# Patient Record
Sex: MALE | URBAN - METROPOLITAN AREA
[De-identification: names, ages, dates, MRNs, and addresses within clinical notes are randomized per-mention and may not be internally consistent; named-entity substitution may affect disease eponyms.]

---

## 2023-10-31 ENCOUNTER — MEDICAL CORRESPONDENCE (OUTPATIENT)
Dept: HEALTH INFORMATION MANAGEMENT | Facility: CLINIC | Age: 28
End: 2023-10-31
Payer: COMMERCIAL

## 2023-10-31 DIAGNOSIS — Z31.448 ENCOUNTER FOR OTHER GENETIC TESTING OF MALE FOR PROCREATIVE MANAGEMENT: Primary | ICD-10-CM

## 2023-10-31 NOTE — PROGRESS NOTES
October 31, 2023    Js opted to proceed with carrier screening for CFTR only through [facility] via saliva. A kit will be mailed to his home. 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     Romelia Sandoval MS, [facility]  Licensed Genetic Counselor  [facility]  Maternal Fetal Medicine  [email or web address]  [phone]

## 2023-11-06 ENCOUNTER — TRANSFERRED RECORDS (OUTPATIENT)
Dept: HEALTH INFORMATION MANAGEMENT | Facility: CLINIC | Age: 28
End: 2023-11-06
Payer: COMMERCIAL

## 2023-11-22 ENCOUNTER — TRANSFERRED RECORDS (OUTPATIENT)
Dept: HEALTH INFORMATION MANAGEMENT | Facility: CLINIC | Age: 28
End: 2023-11-22
Payer: COMMERCIAL

## 2025-06-26 ENCOUNTER — OFFICE VISIT (OUTPATIENT)
Dept: URGENT CARE | Facility: URGENT CARE | Age: 30
End: 2025-06-26
Payer: COMMERCIAL

## 2025-06-26 VITALS
SYSTOLIC BLOOD PRESSURE: 115 MMHG | DIASTOLIC BLOOD PRESSURE: 77 MMHG | WEIGHT: 163.1 LBS | HEIGHT: 71 IN | OXYGEN SATURATION: 99 % | HEART RATE: 85 BPM | TEMPERATURE: 97.4 F | BODY MASS INDEX: 22.83 KG/M2 | RESPIRATION RATE: 22 BRPM

## 2025-06-26 DIAGNOSIS — K52.9 GASTROENTERITIS: Primary | ICD-10-CM

## 2025-06-26 DIAGNOSIS — R19.7 DIARRHEA, UNSPECIFIED TYPE: ICD-10-CM

## 2025-06-26 LAB
ALBUMIN SERPL-MCNC: 4.1 G/DL (ref 3.4–5)
ALP SERPL-CCNC: 49 U/L (ref 40–150)
ALT SERPL W P-5'-P-CCNC: 24 U/L (ref 0–70)
ANION GAP SERPL CALCULATED.3IONS-SCNC: 5 MMOL/L (ref 3–14)
AST SERPL W P-5'-P-CCNC: 40 U/L (ref 0–45)
BASOPHILS # BLD AUTO: 0.1 10E3/UL (ref 0–0.2)
BASOPHILS NFR BLD AUTO: 1 %
BILIRUB SERPL-MCNC: 0.8 MG/DL (ref 0.2–1.3)
BUN SERPL-MCNC: 12 MG/DL (ref 7–30)
CALCIUM SERPL-MCNC: 9 MG/DL (ref 8.5–10.1)
CHLORIDE BLD-SCNC: 105 MMOL/L (ref 94–109)
CO2 SERPL-SCNC: 29 MMOL/L (ref 20–32)
CREAT SERPL-MCNC: 0.8 MG/DL (ref 0.66–1.25)
EGFRCR SERPLBLD CKD-EPI 2021: >90 ML/MIN/1.73M2
EOSINOPHIL # BLD AUTO: 0.1 10E3/UL (ref 0–0.7)
EOSINOPHIL NFR BLD AUTO: 1 %
ERYTHROCYTE [DISTWIDTH] IN BLOOD BY AUTOMATED COUNT: 11.7 % (ref 10–15)
GLUCOSE BLD-MCNC: 92 MG/DL (ref 70–99)
HCT VFR BLD AUTO: 44.1 % (ref 40–53)
HGB BLD-MCNC: 15.3 G/DL (ref 13.3–17.7)
IMM GRANULOCYTES # BLD: 0 10E3/UL
IMM GRANULOCYTES NFR BLD: 0 %
LYMPHOCYTES # BLD AUTO: 1.7 10E3/UL (ref 0.8–5.3)
LYMPHOCYTES NFR BLD AUTO: 23 %
MCH RBC QN AUTO: 29.7 PG (ref 26.5–33)
MCHC RBC AUTO-ENTMCNC: 34.7 G/DL (ref 31.5–36.5)
MCV RBC AUTO: 86 FL (ref 78–100)
MONOCYTES # BLD AUTO: 0.9 10E3/UL (ref 0–1.3)
MONOCYTES NFR BLD AUTO: 12 %
NEUTROPHILS # BLD AUTO: 4.7 10E3/UL (ref 1.6–8.3)
NEUTROPHILS NFR BLD AUTO: 63 %
PLATELET # BLD AUTO: 293 10E3/UL (ref 150–450)
POTASSIUM BLD-SCNC: 4.1 MMOL/L (ref 3.4–5.3)
PROT SERPL-MCNC: 7.8 G/DL (ref 6.8–8.8)
RBC # BLD AUTO: 5.16 10E6/UL (ref 4.4–5.9)
SODIUM SERPL-SCNC: 139 MMOL/L (ref 135–145)
WBC # BLD AUTO: 7.5 10E3/UL (ref 4–11)

## 2025-06-26 ASSESSMENT — ENCOUNTER SYMPTOMS
VOMITING: 0
FREQUENCY: 0
DIFFICULTY URINATING: 0
RESPIRATORY NEGATIVE: 1
CARDIOVASCULAR NEGATIVE: 1
DIARRHEA: 1
ABDOMINAL PAIN: 0
FATIGUE: 1
CHILLS: 1
DYSURIA: 0
HEMATOCHEZIA: 0
EYES NEGATIVE: 1
NAUSEA: 1

## 2025-06-26 NOTE — PROGRESS NOTES
Urgent Care Clinic Visit    Chief Complaint   Patient presents with    Abdominal Pain     Stomach pain x 4 days, diarrhea,fever, chills, headache, fatigued, lightheaded,               6/26/2025     1:08 PM   Additional Questions   Roomed by kinza

## 2025-06-26 NOTE — PATIENT INSTRUCTIONS
Examination and lab work is reassuring.    Lipase pending - will only call if elevated    Stool studies pending - will only call if positive results; do NOT take any anti-diarrheals until we know the results of your stool studies.    Increase fluids with water, Pedialyte, Gatorade, or rehydrating beverages. Take small frequent sips (even a little as a spoonful every 5 minutes), do not take large gulps or amounts of fluid at a time.     Alternate Tylenol and Ibuprofen as needed for aches, pains or fever.     Follow clear liquid to BRAT diet (bananas, rice, applesauce, toast) as needed for any upset stomach. (If upset stomach avoid dairy and meat for 24 hours.)     Rest as much as possible.     Referral to primary care to establish care.     Follow up clinic if symptoms persist, or if they worsen (dehydration, specific abdominal pain, etc) go to the Emergency Department.

## 2025-06-26 NOTE — PROGRESS NOTES
Assessment & Plan       ICD-10-CM    1. Gastroenteritis  K52.9       2. Diarrhea, unspecified type  R19.7 Lipase     CBC with platelets and differential     Comprehensive metabolic panel (BMP + Alb, Alk Phos, ALT, AST, Total. Bili, TP)     Lipase     CBC with platelets and differential     Comprehensive metabolic panel (BMP + Alb, Alk Phos, ALT, AST, Total. Bili, TP)     Focused Enteric Pathogen Panel by PCR     Primary Care Referral     Focused Enteric Pathogen Panel by PCR     CANCELED: Comprehensive metabolic panel (BMP + Alb, Alk Phos, ALT, AST, Total. Bili, TP)           MDM: Patient has had nausea and watery diarrhea for approximately 5 days, no focal abdominal pain, no fevers.  He has been taking in fluids well.  Offered prescription of Zofran but declined today.  Reviewed CBC and CMP myself as well as with the patient.  After further discussion we will order a focused stool study panel.  Patient will return this at his convenience.  He does understand we will only call if results are positive. Will only call if lipase is elevated. Referral to establish care in primary care.     Patient Instructions   Examination and lab work is reassuring.    Lipase pending - will only call if elevated    Stool studies pending - will only call if positive results; do NOT take any anti-diarrheals until we know the results of your stool studies.    Increase fluids with water, Pedialyte, Gatorade, or rehydrating beverages. Take small frequent sips (even a little as a spoonful every 5 minutes), do not take large gulps or amounts of fluid at a time.     Alternate Tylenol and Ibuprofen as needed for aches, pains or fever.     Follow clear liquid to BRAT diet (bananas, rice, applesauce, toast) as needed for any upset stomach. (If upset stomach avoid dairy and meat for 24 hours.)     Rest as much as possible.     Referral to primary care to establish care.     Follow up clinic if symptoms persist, or if they worsen (dehydration,  specific abdominal pain, etc) go to the Emergency Department.      Patient agreed to the treatment plan and verbalized understanding.     Recent Results (from the past 24 hours)   CBC with platelets and differential    Narrative    The following orders were created for panel order CBC with platelets and differential.  Procedure                               Abnormality         Status                     ---------                               -----------         ------                     CBC with platelets and ...[[id number]]                      Final result                 Please view results for these tests on the individual orders.   Comprehensive metabolic panel (BMP + Alb, Alk Phos, ALT, AST, Total. Bili, TP)   Result Value Ref Range    Sodium 139 135 - 145 mmol/L    Potassium 4.1 3.4 - 5.3 mmol/L    Chloride 105 94 - 109 mmol/L    Carbon Dioxide (CO2) 29 20 - 32 mmol/L    Anion Gap 5 3 - 14 mmol/L    Urea Nitrogen 12 7 - 30 mg/dL    Creatinine 0.80 0.66 - 1.25 mg/dL    GFR Estimate >90 >60 mL/min/1.73m2    Calcium 9.0 8.5 - 10.1 mg/dL    Glucose 92 70 - 99 mg/dL    Alkaline Phosphatase 49 40 - 150 U/L    AST 40 0 - 45 U/L    ALT 24 0 - 70 U/L    Protein Total 7.8 6.8 - 8.8 g/dL    Albumin 4.1 3.4 - 5.0 g/dL    Bilirubin Total 0.8 0.2 - 1.3 mg/dL   CBC with platelets and differential   Result Value Ref Range    WBC Count 7.5 4.0 - 11.0 10e3/uL    RBC Count 5.16 4.40 - 5.90 10e6/uL    Hemoglobin 15.3 13.3 - 17.7 g/dL    Hematocrit 44.1 40.0 - 53.0 %    MCV 86 78 - 100 fL    MCH 29.7 26.5 - 33.0 pg    MCHC 34.7 31.5 - 36.5 g/dL    RDW 11.7 10.0 - 15.0 %    Platelet Count 293 150 - 450 10e3/uL    % Neutrophils 63 %    % Lymphocytes 23 %    % Monocytes 12 %    % Eosinophils 1 %    % Basophils 1 %    % Immature Granulocytes 0 %    Absolute Neutrophils 4.7 1.6 - 8.3 10e3/uL    Absolute Lymphocytes 1.7 0.8 - 5.3 10e3/uL    Absolute Monocytes 0.9 0.0 - 1.3 10e3/uL    Absolute Eosinophils 0.1 0.0 - 0.7 10e3/uL     Absolute Basophils 0.1 0.0 - 0.2 10e3/uL    Absolute Immature Granulocytes 0.0 <=0.4 10e3/uL       Subjective     Js is a 29 year old male who presents to clinic today for the following health issues:  Chief Complaint   Patient presents with    Abdominal Pain     Stomach pain x 4 days, diarrhea,fever, chills, headache, fatigued, lightheaded,     HPI  Patient has had diarrhea for 5 days.  He states [place] [employer] [place] and was very hot due to the weather being over 97 degrees.  He states he had a difficult time keeping up with his hydration.  Then on Sunday he developed headache and chills with nausea and diarrhea.  He states the headache and chills resolved but his nausea and diarrhea has persisted.  He states he has been taking in fluids well with electrolyte hydration packets. He is also fatiuged but he states hasn't been sleeping well due to the frequent diarrhea. He states the diarrhea is watery in nature. He denies any vomiting. He denies any blood in her stool or black tarry stools. No recent travel outside the country. No drinking from any unclean water sources -he states he was drinking bottled water on the [employer] [place] on Saturday. No history of H. Pylori or C. Difficle. No recent antibiotic use. No medications for his symptoms.       Review of Systems   Constitutional:  Positive for chills (Only on Saturday,  06/21/2025, 5 days ago) and fatigue.   HENT: Negative.     Eyes: Negative.    Respiratory: Negative.     Cardiovascular: Negative.    Gastrointestinal:  Positive for diarrhea and nausea. Negative for abdominal pain, hematochezia and vomiting.   Genitourinary:  Negative for difficulty urinating, dysuria, frequency and urgency.       Problem List:  There are no relevant problems documented for this patient.      History reviewed. No pertinent past medical history.      Social History     Tobacco Use    Smoking status: Not on file    Smokeless tobacco: Not on file   Substance Use  "Topics    Alcohol use: Not on file           Objective    /77   Pulse 85   Temp 97.4  F (36.3  C) (Tympanic)   Resp 22   Ht 1.803 m (5' 11\")   Wt 74 kg (163 lb 1.6 oz)   SpO2 99%   BMI 22.75 kg/m    Physical Exam  Vitals and nursing note reviewed.   Constitutional:       Appearance: Normal appearance.   HENT:      Head: Normocephalic and atraumatic.      Right Ear: Tympanic membrane and ear canal normal.      Left Ear: Tympanic membrane and ear canal normal.      Nose: Nose normal.      Mouth/Throat:      Mouth: Mucous membranes are moist.      Pharynx: Oropharynx is clear.   Eyes:      General:         Right eye: No discharge.         Left eye: No discharge.      Extraocular Movements: Extraocular movements intact.      Conjunctiva/sclera: Conjunctivae normal.      Pupils: Pupils are equal, round, and reactive to light.   Cardiovascular:      Rate and Rhythm: Normal rate and regular rhythm.      Heart sounds: Normal heart sounds.   Pulmonary:      Effort: Pulmonary effort is normal.      Breath sounds: Normal breath sounds.   Abdominal:      General: Abdomen is flat. Bowel sounds are normal.      Palpations: Abdomen is soft.      Tenderness: There is no abdominal tenderness.   Musculoskeletal:      Cervical back: Normal range of motion and neck supple.   Lymphadenopathy:      Cervical: No cervical adenopathy.   Neurological:      Mental Status: He is alert.              Yaquelin Emanuel NP    "

## 2025-06-30 ENCOUNTER — OFFICE VISIT (OUTPATIENT)
Dept: FAMILY MEDICINE | Facility: CLINIC | Age: 30
End: 2025-06-30
Payer: COMMERCIAL

## 2025-06-30 VITALS
SYSTOLIC BLOOD PRESSURE: 110 MMHG | WEIGHT: 164 LBS | HEART RATE: 73 BPM | OXYGEN SATURATION: 99 % | RESPIRATION RATE: 15 BRPM | HEIGHT: 70 IN | DIASTOLIC BLOOD PRESSURE: 60 MMHG | TEMPERATURE: 98 F | BODY MASS INDEX: 23.48 KG/M2

## 2025-06-30 DIAGNOSIS — R19.7 DIARRHEA, UNSPECIFIED TYPE: Primary | ICD-10-CM

## 2025-06-30 DIAGNOSIS — R74.8 ELEVATED LIPASE: ICD-10-CM

## 2025-06-30 LAB
AMYLASE SERPL-CCNC: 73 U/L (ref 28–100)
LIPASE SERPL-CCNC: 40 U/L (ref 13–60)

## 2025-06-30 PROCEDURE — 3074F SYST BP LT 130 MM HG: CPT | Performed by: NURSE PRACTITIONER

## 2025-06-30 PROCEDURE — 99213 OFFICE O/P EST LOW 20 MIN: CPT | Performed by: NURSE PRACTITIONER

## 2025-06-30 PROCEDURE — 83690 ASSAY OF LIPASE: CPT | Performed by: NURSE PRACTITIONER

## 2025-06-30 PROCEDURE — 36415 COLL VENOUS BLD VENIPUNCTURE: CPT | Performed by: NURSE PRACTITIONER

## 2025-06-30 PROCEDURE — 82150 ASSAY OF AMYLASE: CPT | Performed by: NURSE PRACTITIONER

## 2025-06-30 PROCEDURE — 3078F DIAST BP <80 MM HG: CPT | Performed by: NURSE PRACTITIONER

## 2025-06-30 PROCEDURE — G2211 COMPLEX E/M VISIT ADD ON: HCPCS | Performed by: NURSE PRACTITIONER

## 2025-06-30 RX ORDER — DEXTROAMPHETAMINE SACCHARATE, AMPHETAMINE ASPARTATE MONOHYDRATE, DEXTROAMPHETAMINE SULFATE AND AMPHETAMINE SULFATE 7.5; 7.5; 7.5; 7.5 MG/1; MG/1; MG/1; MG/1
CAPSULE, EXTENDED RELEASE ORAL
COMMUNITY
Start: 2025-06-12

## 2025-06-30 RX ORDER — ESCITALOPRAM OXALATE 20 MG/1
TABLET ORAL
COMMUNITY
Start: 2025-06-08

## 2025-06-30 RX ORDER — PROPRANOLOL HCL 20 MG
1 TABLET ORAL
COMMUNITY
Start: 2025-05-09

## 2025-06-30 NOTE — PATIENT INSTRUCTIONS
Based on our discussion, I have outlined the following instructions for you:      Lab test will be done today to check your lipase levels again. This will help us make sure they are going back to normal.    Thank you again for your visit, and we look forward to supporting you in your journey to better health.

## 2025-06-30 NOTE — PROGRESS NOTES
Assessment & Plan     Diarrhea, unspecified type    - Primary Care Referral    Elevated lipase    - Lipase  - Amylase  - Lipase  - Amylase    Assessment & Plan  Elevated lipase:  - Elevated lipase may indicate pancreatic involvement, but low suspicion for ongoing pancreatitis due to symptom improvement. No family history of pancreatic issues.  - Repeat  lab to ensure levels are returning to normal.      MED REC REQUIRED{  Post Medication Reconciliation Status: patient was not discharged from an inpatient facility or TCU    Return in about 5 weeks (around 8/4/2025) for Wellness exam fasting labs.     Devin Weinstein is a 29 year old, presenting for the following health issues:  Urgent Care        6/30/2025     7:12 AM   Additional Questions   Roomed by Rere GALINDO   Accompanied by Self     HPI       - Recently visited urgent care due to feeling unwell.  - Lipase levels were elevated  - Experienced nausea and diarrhea, but no vomiting.  - Symptoms improved since the urgent care visit.  - No family history of pancreatic issues.  - Recently went golfing two Saturdays ago in hot weather, which led to feeling unwell.  - Kidney and liver function tests were normal.  - Currently taking adderall, initially prescribed through an online clinic for ADHD.  - Has been using the online clinic for approximately two years.  - Regular doctor at the online clinic changed multiple times.  Wants to establish care here and have us take over meds      UC Followup:    Facility:  [facility]  Date of visit: 06/26/2025  Reason for visit: Gastroenteritis, Diarrhea, abdominal pain  Current Status: So much better - has not has BM since Saturday. No more abdominal pain - no more nausea    ADHD online        Review of Systems  Constitutional, neuro, ENT, endocrine, pulmonary, cardiac, gastrointestinal, genitourinary, musculoskeletal, integument and psychiatric systems are negative, except as otherwise noted.     "  Objective    /60 (BP Location: Left arm, Patient Position: Chair, Cuff Size: Adult Regular)   Pulse 73   Temp 98  F (36.7  C) (Tympanic)   Resp 15   Ht 1.785 m (5' 10.28\")   Wt 74.4 kg (164 lb)   SpO2 99%   BMI 23.35 kg/m    Body mass index is 23.35 kg/m .  Physical Exam   GENERAL: alert and no distress  RESP: lungs clear to auscultation - no rales, rhonchi or wheezes  CV: regular rate and rhythm, normal S1 S2, no S3 or S4, no murmur, click or rub, no peripheral edema  ABDOMEN: soft, nontender, no hepatosplenomegaly, no masses and bowel sounds normal  PSYCH: mentation appears normal, affect normal/bright         Signed Electronically by: NILSA Tinoco CNP    "

## 2025-07-02 ENCOUNTER — RESULTS FOLLOW-UP (OUTPATIENT)
Dept: FAMILY MEDICINE | Facility: CLINIC | Age: 30
End: 2025-07-02

## 2025-08-10 ENCOUNTER — HEALTH MAINTENANCE LETTER (OUTPATIENT)
Age: 30
End: 2025-08-10